# Patient Record
(demographics unavailable — no encounter records)

---

## 2025-02-25 NOTE — DISCUSSION/SUMMARY
[FreeTextEntry1] : In summary   Pleasant, 56 year old with a past medical history of Depression, Hyperlipidemia (FHx Uncle MI 70 yr), PERSONAL MI?, HTN  =============== =============== Hyperlipidemia (FHx Uncle MI 70 yr), Remote History of ?MI in s/o EtOH  - Monitor   HTN   - Continue Amlodipine 5   ************Mn SOB/NEW systolic murmur /III/VI CHANA holosystolic concern for new MR******************* -TTE - check one now 3-2024  first detected                     Cholesterol management - Assessed - Impression is active; changes as per above - Discussed diet and exercise at length - Any lifestyle/medication changes as per above Risk factors for cardiomyopathy - Assessed - Impression is stable - Reviewed records from PCP BP - Assessed - Impression is active Cardiac Health optimization - Discussed diet and exercise at length - Discussed importance of monitoring and re-assessment of cardiac health on further visits        Ismael, as always, it was a pleasure to participate in the care of your patient.   With kind thanks for the referral.   Rodney Ladd MD [facility] SARAH LYNCH Director, Preventive Cardiology & Lipidology [facility]                                                                                                                                                                                                                                                                                                                                                 40 minutes spent in patient encounter explaining and formulating rationale for treatment plan. >50% of time spent in direct counseling reviewing all tests, labs, and imaging and conferring with patient, family member, and other physicians regarding patient care                                                                                                                                                                                                                                                                                                                                                                      [EKG obtained to assist in diagnosis and management of assessed problem(s)] : EKG obtained to assist in diagnosis and management of assessed problem(s)

## 2025-02-25 NOTE — DISCUSSION/SUMMARY
[FreeTextEntry1] : In summary   Pleasant, 56 year old with a past medical history of Depression, Hyperlipidemia (FHx Uncle MI 70 yr), PERSONAL MI?, HTN  =============== =============== Hyperlipidemia (FHx Uncle MI 70 yr), Remote History of ?MI in s/o EtOH  - Monitor   HTN   - Continue Amlodipine 5   ************Mn SOB/NEW systolic murmur /III/VI CHANA holosystolic concern for new MR******************* -TTE - check one now 3-2024  first detected                     Cholesterol management - Assessed - Impression is active; changes as per above - Discussed diet and exercise at length - Any lifestyle/medication changes as per above Risk factors for cardiomyopathy - Assessed - Impression is stable - Reviewed records from PCP BP - Assessed - Impression is active Cardiac Health optimization - Discussed diet and exercise at length - Discussed importance of monitoring and re-assessment of cardiac health on further visits        Ismael, as always, it was a pleasure to participate in the care of your patient.   With kind thanks for the referral.   Rodney Ladd MD [facility] SARAH LYNCH Director, Preventive Cardiology & Lipidology [facility]                                                                                                                                                                                                                                                                                                                                                 40 minutes spent in patient encounter explaining and formulating rationale for treatment plan. >50% of time spent in direct counseling reviewing all tests, labs, and imaging and conferring with patient, family member, and other physicians regarding patient care                                                                                                                                                                                                                                                                                                                                                                      [EKG obtained to assist in diagnosis and management of assessed problem(s)] : EKG obtained to assist in diagnosis and management of assessed problem(s)

## 2025-02-25 NOTE — HISTORY OF PRESENT ILLNESS
[FreeTextEntry1] : Dear Ismael,   I had the pleasure of seeing your patient MARIANNE MCKEON for Cardiometabolic evaluation.   As you know, he  is a Pleasant, 56 year old with a past medical history of Depression, Hyperlipidemia (FHx Uncle MI 70 yr), PERSONAL MI?, HTN  =============== =============== Hyperlipidemia (FHx Uncle MI 70 yr), Remote History of ?MI in s/o EtOH  - Monitor   HTN   - Continue Amlodipine 5   ************Mn SOB/NEW systolic murmur /III/VI CHANA holosystolic concern for new MR******************* -TTE - check one now 3-2024  first detected ----------------------------  ----------------- ----------------- 2-2025 CC: Heart Issues - Notes No CP/SOB/Palps/Leg swelling - Reports No F/C/N/V/Headaches - Reports Normal Exercise Tolerance - Reports no medication changes - Reports normal mood/quality of life - Reports no diet changes - Reports no body aches - Reports no recent colds/viruses - Recent labs/imaging reviewed - Relevant Family history reviewed Mother/Father - CV Risk Assessment for 10 Year ACC/AHA Pooled Risk Cohort Equation places this person at < 7.5% Risk of ASCVD TTE --  - LVEF  Nl

## 2025-07-11 NOTE — DISCUSSION/SUMMARY
[FreeTextEntry1] : In summary   Pleasant, 56 year old with a past medical history of Depression, Hyperlipidemia (FHx Uncle MI 70 yr), PERSONAL MI?, HTN  =============== =============== Hyperlipidemia (FHx Uncle MI 70 yr), Remote History of ?MI in s/o EtOH  - Monitor   HTN   - Continue Amlodipine 5   ************Mn SOB/NEW systolic murmur /III/VI CHANA holosystolic concern for new MR******************* -TTE - check one now 3-2024  first detected                     Cholesterol management - Assessed - Impression is active; changes as per above - Discussed diet and exercise at length - Any lifestyle/medication changes as per above Risk factors for cardiomyopathy - Assessed - Impression is stable - Reviewed records from PCP BP - Assessed - Impression is active Cardiac Health optimization - Discussed diet and exercise at length - Discussed importance of monitoring and re-assessment of cardiac health on further visits        Ismael, as always, it was a pleasure to participate in the care of your patient.   With kind thanks for the referral.   Rodney Ladd MD [facility] SARAH LYNCH Director, Preventive Cardiology & Lipidology [facility]                                                                                                                                                                                                                                                                                                                                                 40 minutes spent in patient encounter explaining and formulating rationale for treatment plan. >50% of time spent in direct counseling reviewing all tests, labs, and imaging and conferring with patient, family member, and other physicians regarding patient care